# Patient Record
Sex: MALE | Race: WHITE | Employment: FULL TIME | URBAN - METROPOLITAN AREA
[De-identification: names, ages, dates, MRNs, and addresses within clinical notes are randomized per-mention and may not be internally consistent; named-entity substitution may affect disease eponyms.]

---

## 2020-01-31 ENCOUNTER — HOSPITAL ENCOUNTER (EMERGENCY)
Age: 40
Discharge: HOME | End: 2020-01-31
Attending: EMERGENCY MEDICINE

## 2020-01-31 VITALS
RESPIRATION RATE: 18 BRPM | WEIGHT: 220.46 LBS | OXYGEN SATURATION: 98 % | DIASTOLIC BLOOD PRESSURE: 110 MMHG | SYSTOLIC BLOOD PRESSURE: 151 MMHG | HEART RATE: 95 BPM | TEMPERATURE: 97.9 F

## 2020-01-31 PROCEDURE — 99283 EMERGENCY DEPT VISIT LOW MDM: CPT

## 2020-01-31 PROCEDURE — 96372 THER/PROPH/DIAG INJ SC/IM: CPT

## 2020-01-31 PROCEDURE — 6370000000 HC RX 637 (ALT 250 FOR IP): Performed by: EMERGENCY MEDICINE

## 2020-01-31 PROCEDURE — 6360000002 HC RX W HCPCS: Performed by: EMERGENCY MEDICINE

## 2020-01-31 RX ORDER — LIDOCAINE HYDROCHLORIDE 20 MG/ML
5 SOLUTION OROPHARYNGEAL ONCE
Status: COMPLETED | OUTPATIENT
Start: 2020-01-31 | End: 2020-01-31

## 2020-01-31 RX ORDER — FAMOTIDINE 20 MG/1
20 TABLET, FILM COATED ORAL ONCE
Status: COMPLETED | OUTPATIENT
Start: 2020-01-31 | End: 2020-01-31

## 2020-01-31 RX ORDER — OXYCODONE HYDROCHLORIDE AND ACETAMINOPHEN 5; 325 MG/1; MG/1
2 TABLET ORAL ONCE
Status: COMPLETED | OUTPATIENT
Start: 2020-01-31 | End: 2020-01-31

## 2020-01-31 RX ORDER — MAGNESIUM HYDROXIDE/ALUMINUM HYDROXICE/SIMETHICONE 120; 1200; 1200 MG/30ML; MG/30ML; MG/30ML
15 SUSPENSION ORAL ONCE
Status: COMPLETED | OUTPATIENT
Start: 2020-01-31 | End: 2020-01-31

## 2020-01-31 RX ADMIN — OXYCODONE HYDROCHLORIDE AND ACETAMINOPHEN 2 TABLET: 5; 325 TABLET ORAL at 16:10

## 2020-01-31 RX ADMIN — HYDROMORPHONE HYDROCHLORIDE 1 MG: 1 INJECTION, SOLUTION INTRAMUSCULAR; INTRAVENOUS; SUBCUTANEOUS at 15:28

## 2020-01-31 RX ADMIN — LIDOCAINE HYDROCHLORIDE 5 ML: 20 SOLUTION ORAL; TOPICAL at 15:27

## 2020-01-31 RX ADMIN — ALUMINUM HYDROXIDE, MAGNESIUM HYDROXIDE, AND SIMETHICONE 15 ML: 200; 200; 20 SUSPENSION ORAL at 15:27

## 2020-01-31 RX ADMIN — FAMOTIDINE 20 MG: 20 TABLET, FILM COATED ORAL at 15:26

## 2020-01-31 ASSESSMENT — PAIN DESCRIPTION - LOCATION: LOCATION: ABDOMEN

## 2020-01-31 ASSESSMENT — PAIN SCALES - GENERAL
PAINLEVEL_OUTOF10: 6
PAINLEVEL_OUTOF10: 8
PAINLEVEL_OUTOF10: 8

## 2020-01-31 ASSESSMENT — ENCOUNTER SYMPTOMS
NAUSEA: 1
ABDOMINAL PAIN: 1
COUGH: 0
DIARRHEA: 0
VOMITING: 0

## 2020-01-31 ASSESSMENT — PAIN DESCRIPTION - PAIN TYPE: TYPE: CHRONIC PAIN

## 2020-01-31 NOTE — ED PROVIDER NOTES
[address] [facility]  [address] [facility] [place] [address]  Phone: [phone]  Fax: [phone] [address] [facility] ED  eMERGENCY dEPARTMENT eNCOUnter      Pt Name: Edwina Rg  MRN: [id number]  [place] 1980  Date of evaluation: 1/31/2020  Provider: Price Tang, [facility]       Chief Complaint   Patient presents with    Abdominal Pain     pt has colitis and needs pain meds to get back home to [place], pt has pain episodes          HISTORY OF PRESENT ILLNESS   (Location/Symptom, Timing/Onset,Context/Setting, Quality, Duration, Modifying Factors, Severity)  Note limiting factors. Edwina Rg is a 44 y.o. male who presents to the emergency department for treatment of abdominal pain. Patient states that he has chronic colitis, patient states that he is from [place] visiting the [place]. Patient states he did not get a past to bring his medication with him, normally at home he states about 3 or 4 times a year he gets a flareup of his colitis and has to take morphine for pain. Patient states since coming to the [place] and eating her food, it is significantly different from the food he eats back home, he has had a lot of acid reflux, some regurgitation, some abdominal pain as well. Patient states that he would only like to be treated for his acid reflux and the pain. He knows what is causing it and he does not want any blood work or testing. He denies fever. Nursing Notes were reviewed. REVIEW OF SYSTEMS    (2-9systems for level 4, 10 or more for level 5)     Review of Systems   Constitutional: Negative for fever. Respiratory: Negative for cough. Cardiovascular: Negative for chest pain. Gastrointestinal: Positive for abdominal pain and nausea. Negative for diarrhea and vomiting. Skin: Negative for rash. Neurological: Negative for weakness. All other systems reviewed and are negative.       Except asnoted above the remainder of otherwise unremarkable. Physical examination reveals no acute abnormalities or obvious findings. I did discuss getting some belly labs and possibly an x-ray but the patient states he would prefer to just be treated for the pain as that is all he would do at home at this time. I felt this was a practical plan, I did treat him with some pain medication and a GI cocktail. He had improvement in the emergency department and was given 1 dose of Percocet there would be a little bit longer control of the pain. I have instructed him to follow-up with his physicians back home in [place] when he gets back. At this time the patient is without objective evidence of an acute process requiring hospitalization or inpatient management. They have remained hemodynamically stable and are stable for discharge with outpatient follow-up. Standard anticipatory guidance given to patient upon discharge. Have given them a specific time frame in which to follow-up and who to follow-up with. I have also advised them that they should return to the emergency department if they get worse, or not getting better or develop any new or concerning symptoms. Patient demonstrates understanding. PROCEDURES:  Unless otherwise noted below, none     Procedures    FINAL IMPRESSION      1.  Pain of upper abdomen          DISPOSITION/PLAN   DISPOSITION Decision To Discharge 01/31/2020 03:37:40 PM      PATIENT REFERRED TO:  Your primary physician in [address] [place] [address] 10:  New Prescriptions    No medications on file          (Please note that portions of this note were completed with a voice recognition program.  Efforts were made to edit the dictations but occasionally words are mis-transcribed.)    Efrain Springer DO (electronically signed)  Board Certified Emergency Physician          Efrain Springer DO  01/31/20 1434